# Patient Record
Sex: MALE | Race: WHITE
[De-identification: names, ages, dates, MRNs, and addresses within clinical notes are randomized per-mention and may not be internally consistent; named-entity substitution may affect disease eponyms.]

---

## 2018-08-24 ENCOUNTER — HOSPITAL ENCOUNTER (OUTPATIENT)
Dept: HOSPITAL 42 - ED | Age: 23
Setting detail: OBSERVATION
LOS: 1 days | Discharge: LEFT BEFORE DISCHARGE | End: 2018-08-25
Attending: INTERNAL MEDICINE | Admitting: INTERNAL MEDICINE
Payer: MEDICAID

## 2018-08-24 DIAGNOSIS — F12.90: ICD-10-CM

## 2018-08-24 DIAGNOSIS — Z83.3: ICD-10-CM

## 2018-08-24 DIAGNOSIS — F41.0: ICD-10-CM

## 2018-08-24 DIAGNOSIS — Z88.0: ICD-10-CM

## 2018-08-24 DIAGNOSIS — D50.9: ICD-10-CM

## 2018-08-24 DIAGNOSIS — F90.9: ICD-10-CM

## 2018-08-24 DIAGNOSIS — K76.0: Primary | ICD-10-CM

## 2018-08-24 DIAGNOSIS — D72.829: ICD-10-CM

## 2018-08-24 DIAGNOSIS — F41.1: ICD-10-CM

## 2018-08-24 DIAGNOSIS — Z82.3: ICD-10-CM

## 2018-08-24 DIAGNOSIS — R11.10: ICD-10-CM

## 2018-08-24 DIAGNOSIS — Z87.81: ICD-10-CM

## 2018-08-24 LAB
ALBUMIN SERPL-MCNC: 4.7 G/DL (ref 3–4.8)
ALBUMIN/GLOB SERPL: 1.6 {RATIO} (ref 1.1–1.8)
ALT SERPL-CCNC: 19 U/L (ref 7–56)
AMYLASE SERPL-CCNC: 364 U/L (ref 35–125)
APPEARANCE UR: CLEAR
AST SERPL-CCNC: 20 U/L (ref 17–59)
BASOPHILS # BLD AUTO: 0.01 K/MM3 (ref 0–2)
BASOPHILS NFR BLD: 0.1 % (ref 0–3)
BILIRUB UR-MCNC: NEGATIVE MG/DL
BUN SERPL-MCNC: 15 MG/DL (ref 7–21)
CALCIUM SERPL-MCNC: 9.5 MG/DL (ref 8.4–10.5)
COLOR UR: YELLOW
EOSINOPHIL # BLD: 0 10*3/UL (ref 0–0.7)
EOSINOPHIL NFR BLD: 0.1 % (ref 1.5–5)
ERYTHROCYTE [DISTWIDTH] IN BLOOD BY AUTOMATED COUNT: 16.4 % (ref 11.5–14.5)
GFR NON-AFRICAN AMERICAN: > 60
GLUCOSE UR STRIP-MCNC: NEGATIVE MG/DL
GRANULOCYTES # BLD: 17.2 10*3/UL (ref 1.4–6.5)
GRANULOCYTES NFR BLD: 90.7 % (ref 50–68)
HDLC SERPL-MCNC: 47 MG/DL (ref 29–60)
HGB BLD-MCNC: 13.2 G/DL (ref 14–18)
HYPOCHROMIA BLD QL SMEAR: (no result)
LDLC SERPL-MCNC: 74 MG/DL (ref 0–129)
LEUKOCYTE ESTERASE UR-ACNC: NEGATIVE LEU/UL
LIPASE SERPL-CCNC: 747 U/L (ref 23–300)
LYMPHOCYTE: 5 % (ref 22–35)
LYMPHOCYTES # BLD: 1 10*3/UL (ref 1.2–3.4)
LYMPHOCYTES NFR BLD AUTO: 5.3 % (ref 22–35)
MCH RBC QN AUTO: 19.6 PG (ref 25–35)
MCHC RBC AUTO-ENTMCNC: 32.8 G/DL (ref 31–37)
MCV RBC AUTO: 59.8 FL (ref 80–105)
MICROCYTES BLD QL SMEAR: (no result)
MONOCYTE: 5 % (ref 1–6)
MONOCYTES # BLD AUTO: 0.7 10*3/UL (ref 0.1–0.6)
MONOCYTES NFR BLD: 3.8 % (ref 1–6)
NEUTROPHILS NFR BLD AUTO: 90 % (ref 50–70)
PH UR STRIP: 6 [PH] (ref 4.7–8)
PLATELET # BLD EST: NORMAL 10*3/UL
PLATELET # BLD: 178 10^3/UL (ref 120–450)
PROT UR STRIP-MCNC: NEGATIVE MG/DL
RBC # BLD AUTO: 6.72 10^6/UL (ref 3.5–6.1)
RBC # UR STRIP: NEGATIVE /UL
SP GR UR STRIP: 1.01 (ref 1–1.03)
UROBILINOGEN UR STRIP-ACNC: 0.2 E.U./DL
WBC # BLD AUTO: 18.9 10^3/UL (ref 4.5–11)

## 2018-08-24 PROCEDURE — 76700 US EXAM ABDOM COMPLETE: CPT

## 2018-08-24 PROCEDURE — 82728 ASSAY OF FERRITIN: CPT

## 2018-08-24 PROCEDURE — 80074 ACUTE HEPATITIS PANEL: CPT

## 2018-08-24 PROCEDURE — 36415 COLL VENOUS BLD VENIPUNCTURE: CPT

## 2018-08-24 PROCEDURE — 93005 ELECTROCARDIOGRAM TRACING: CPT

## 2018-08-24 PROCEDURE — 96376 TX/PRO/DX INJ SAME DRUG ADON: CPT

## 2018-08-24 PROCEDURE — 99285 EMERGENCY DEPT VISIT HI MDM: CPT

## 2018-08-24 PROCEDURE — 84100 ASSAY OF PHOSPHORUS: CPT

## 2018-08-24 PROCEDURE — 82247 BILIRUBIN TOTAL: CPT

## 2018-08-24 PROCEDURE — 96374 THER/PROPH/DIAG INJ IV PUSH: CPT

## 2018-08-24 PROCEDURE — 82248 BILIRUBIN DIRECT: CPT

## 2018-08-24 PROCEDURE — 82150 ASSAY OF AMYLASE: CPT

## 2018-08-24 PROCEDURE — 74177 CT ABD & PELVIS W/CONTRAST: CPT

## 2018-08-24 PROCEDURE — 81003 URINALYSIS AUTO W/O SCOPE: CPT

## 2018-08-24 PROCEDURE — 80061 LIPID PANEL: CPT

## 2018-08-24 PROCEDURE — 83690 ASSAY OF LIPASE: CPT

## 2018-08-24 PROCEDURE — 71045 X-RAY EXAM CHEST 1 VIEW: CPT

## 2018-08-24 PROCEDURE — 84443 ASSAY THYROID STIM HORMONE: CPT

## 2018-08-24 PROCEDURE — 87040 BLOOD CULTURE FOR BACTERIA: CPT

## 2018-08-24 PROCEDURE — 83021 HEMOGLOBIN CHROMOTOGRAPHY: CPT

## 2018-08-24 PROCEDURE — 85025 COMPLETE CBC W/AUTO DIFF WBC: CPT

## 2018-08-24 PROCEDURE — 83735 ASSAY OF MAGNESIUM: CPT

## 2018-08-24 PROCEDURE — 85041 AUTOMATED RBC COUNT: CPT

## 2018-08-24 PROCEDURE — 83540 ASSAY OF IRON: CPT

## 2018-08-24 PROCEDURE — 96375 TX/PRO/DX INJ NEW DRUG ADDON: CPT

## 2018-08-24 PROCEDURE — 83550 IRON BINDING TEST: CPT

## 2018-08-24 PROCEDURE — 85014 HEMATOCRIT: CPT

## 2018-08-24 PROCEDURE — 96361 HYDRATE IV INFUSION ADD-ON: CPT

## 2018-08-24 PROCEDURE — 85018 HEMOGLOBIN: CPT

## 2018-08-24 PROCEDURE — 80053 COMPREHEN METABOLIC PANEL: CPT

## 2018-08-24 NOTE — ED PDOC
Arrival/HPI





- General


Chief Complaint: Abdominal Pain


Time Seen by Provider: 08/24/18 13:05


Historian: Patient





- History of Present Illness


Narrative History of Present Illness (Text): 





08/24/18 15:49


22-year-old male presents today with upper abdominal pain and nausea and 

vomiting that started this morning. Patient states he is having severe upper 

abdominal pain with associated nausea for the early portion in the morning and 

then developed multiple episodes of vomiting. pt denies cp or sob. no dizziness

, but states he feels weak today. pt denies back pain. no urinary symptoms. no 

other complaints. 


Symptom Onset: Sudden


Symptom Course: Unchanged





Past Medical History





- Provider Review


Nursing Documentation Reviewed: Yes





- Travel History


Have you recently traveled outside [place] w/in the past 3 mons?: No





- Cardiac


Hx Cardiac Disorders: No





- Pulmonary


Hx Respiratory Disorders: No





- Neurological


Hx Neurological Disorder: No





- HEENT


Hx HEENT Disorder: No





- Renal


Hx Renal Disorder: No





- Endocrine/Metabolic


Hx Endocrine Disorders: No





- Hematological/Oncological


Hx Blood Disorders: No





- Integumentary


Hx Dermatological Disorder: No





- Musculoskeletal/Rheumatological


Hx Musculoskeletal Disorders: No





- Gastrointestinal


Hx Gastrointestinal Disorders: No





- Genitourinary/Gynecological


Hx Genitourinary Disorders: No





- Psychiatric


Hx Anxiety: Yes


Hx Substance Use: Yes (Marijuana)





- Surgical History


Other/Comment: Left knee.  Right hand





Family/Social History





- Physician Review


Nursing Documentation Reviewed: Yes


Family/Social History: Unknown Family HX


Smoking Status: Never Smoked


Hx Alcohol Use: Yes


Frequency of alcohol use: Socially


Hx Substance Use: Yes (Marijuana)





Allergies/Home Meds


Allergies/Adverse Reactions: 


Allergies





Penicillins Allergy (Verified 08/24/18 12:44)


 FATIGUE








Home Medications: 


 Home Meds











 Medication  Instructions  Recorded  Confirmed


 


No Known Home Med  08/24/18 08/24/18














Review of Systems





- Review of Systems


Constitutional: Fatigue.  absent: Fevers


Respiratory: absent: SOB, Cough


Cardiovascular: absent: Chest Pain, Palpitations


Gastrointestinal: Abdominal Pain, Nausea, Vomiting.  absent: Constipation


Genitourinary Male: absent: Dysuria, Frequency, Hematuria


Musculoskeletal: absent: Arthralgias, Back Pain, Neck Pain


Skin: absent: Rash, Pruritis


Neurological: absent: Headache, Dizziness


Psychiatric: absent: Anxiety, Depression, Suicidal Ideation





Physical Exam


Vital Signs Reviewed: Yes


Vital Signs











  Temp Pulse Resp BP Pulse Ox


 


 08/24/18 19:12   85  18  131/71  98


 


 08/24/18 16:09   72  18  131/79  99


 


 08/24/18 12:45  97.9 F  78  18  137/85  98











Temperature: Afebrile


Blood Pressure: Normal


Pulse: Regular


Respiratory Rate: Normal


Appearance: Positive for: Well-Appearing, Non-Toxic, Comfortable


Pain Distress: None


Mental Status: Positive for: Alert and Oriented X 3





- Systems Exam


Head: Present: Atraumatic


Mouth: Present: Moist Mucous Membranes


Neck: Present: Normal Range of Motion


Respiratory/Chest: Present: Clear to Auscultation, Good Air Exchange.  No: 

Respiratory Distress, Accessory Muscle Use


Cardiovascular: Present: Regular Rate and Rhythm, Normal S1, S2.  No: Murmurs


Abdomen: Present: Tenderness (ruq, epigastric tenderness).  No: Distention, 

Peritoneal Signs, Rebound, Guarding


Back: Present: Normal Inspection.  No: Midline Tenderness, Paraspinal Tenderness


Upper Extremity: Present: Normal ROM


Lower Extremity: Present: Normal ROM


Neurological: Present: GCS=15, Speech Normal


Skin: Present: Warm, Dry, Normal Color.  No: Rashes


Psychiatric: Present: Alert, Oriented x 3





Medical Decision Making


ED Course and Treatment: 





08/24/18 17:04


Patient is nontoxic well appearing with stable vital signs presenting with 

abdominal pain, nausea/vomiting. 








CBC wbc; 18.9


CMP wnl


Lipase elevated





Urinalysis wnl





cxr; wnl





UDS: + marijuana





Ultrasound: FINDINGS:


LIVER:


Measures 13.2 cm.  There is diffuse increased echogenicity and coarse 

echotexture of the liver parenchyma. No mass. No intrahepatic bile duct 

dilatation.


GALLBLADDER:


There are no gallstones, wall thickening or pericholecystic fluid.  The 

sonographic Kim's sign is negative. 


COMMON BILE DUCT:


Measures 3.1 mm. No stones. No dilatation.


PANCREAS:


Unremarkable as visualized. No mass. No ductal dilatation.


RIGHT KIDNEY:


Measures 11.3cm. Normal echogenicity. No calculus, mass, or hydronephrosis.


LEFT KIDNEY:


Measures 10.9cm. Normal echogenicity. No calculus, mass, or hydronephrosis.


SPLEEN:


There is borderline splenomegaly. No mass.


AORTA:


No aneurysmal dilatation. 


IVC:


Unremarkable. 


OTHER FINDINGS:


There is trace perihepatic ascites. 


IMPRESSION:


 Diffuse increased echogenicity and coarse echotexture in the liver may reflect 

hepatic steatosis however parenchymal infectious/ inflammatory etiologies 

cannot be entirely excluded.  Clinical and laboratory correlation is advised. 


Borderline splenomegaly. 


Trace perihepatic ascites is of uncertain etiology.


If clinically indicated, CT scan with intravenous contrast may be performed for 

further evaluation.











CAT scan:FINDINGS:


LOWER THORAX:


No visible consolidation, pleural effusion, or pneumothorax.


LIVER:


Unremarkable. 


GALLBLADDER AND BILE DUCTS:


Unremarkable. 


PANCREAS:


Unremarkable. 


SPLEEN:


Borderline splenomegaly. 


ADRENALS:


Unremarkable. 


KIDNEYS AND URETERS:


The kidneys enhance symmetrically. No hydronephrosis or obstructing calculus 

identified. 


VASCULATURE:


No aortic aneurysm. 


BOWEL:


Stomach is nondistended.  


Lack of oral contrast limits evaluation for bowel pathology.  Bowel loops 

appear within normal limits of caliber without evidence of obstruction.


APPENDIX:


The appendix appears within normal limits of caliber. No secondary signs of 

acute appendicitis.


PERITONEUM:


No significant free fluid.  No definite free air. 


LYMPH NODES:


No bulky adenopathy identified. 


BLADDER:


Unremarkable. 


REPRODUCTIVE:


Unremarkable. 


BONES:


Sclerotic focus within the right femoral head, possibly bone island.  No acute 

osseous abnormality is detected. 


OTHER FINDINGS:


None.


IMPRESSION:


Borderline splenomegaly.








Patient reassessment:pt feeling better. still with slight tenderness across 

upper abdomen. 





Discussed all results with patient in depth





case discussed with dr. lehman; accepts observational status admission for 

abdominal pain, leukocytosis and elevated lipase with US finding of perihepatic 

ascites.








Impression: Abdominal pain, leukocytosis, elevated lipase


admit observational status





Reassessment Condition: Re-examined, Improving,but remains with symptoms





- Lab Interpretations


Lab Results: 








 08/24/18 14:15 





 08/24/18 14:15 





 Lab Results





08/24/18 18:25: Urine Opiates Screen Negative, Urine Methadone Screen Negative, 

Ur Barbiturates Screen Negative, Ur Phencyclidine Scrn Negative, Ur 

Amphetamines Screen Negative, U Benzodiazepines Scrn Negative, U Oth Cocaine 

Metabols Negative, U Cannabinoids Screen Positive H


08/24/18 18:25: Urine Color Yellow, Urine Appearance Clear, Urine pH 6.0, Ur 

Specific Gravity 1.010, Urine Protein Negative, Urine Glucose (UA) Negative, 

Urine Ketones Trace H, Urine Blood Negative, Urine Nitrate Negative, Urine 

Bilirubin Negative, Urine Urobilinogen 0.2, Ur Leukocyte Esterase Negative


08/24/18 14:15: WBC 18.9 H, RBC 6.72 H, Hgb 13.2 L, Hct 40.2 L, MCV 59.8 L, MCH 

19.6 L, MCHC 32.8, RDW 16.4 H, Plt Count 178, Gran % 90.7 H, Lymph % (Auto) 5.3 

L, Mono % (Auto) 3.8, Eos % (Auto) 0.1 L, Baso % (Auto) 0.1, Gran # 17.20 H, 

Lymph # (Auto) 1.0 L, Mono # (Auto) 0.7 H, Eos # (Auto) 0.0, Baso # (Auto) 0.01

, Neutrophils % (Manual) 90 H, Lymphocytes % (Manual) 5 L, Monocytes % (Manual) 

5, Platelet Evaluation Normal, Hypochromasia 1+, Microcytosis (manual) 1+


08/24/18 14:15: Sodium 143, Potassium 4.1, Chloride 105, Carbon Dioxide 25, 

Anion Gap 18, BUN 15, Creatinine 0.7 L, Est GFR ( Amer) > 60, Est GFR (

Non-Af Amer) > 60, Random Glucose 92, Calcium 9.5, Total Bilirubin 1.4 H, AST 20

, ALT 19, Alkaline Phosphatase 58, Total Protein 7.6, Albumin 4.7, Globulin 3.0

, Albumin/Globulin Ratio 1.6, Lipase 747 H











- RAD Interpretation


Radiology Orders: 








08/24/18 13:52


ABD & PELVIS IV CONTRAST ONLY [CT] Stat 





08/24/18 15:48


ABDOMEN COMPLETE [US] Stat 





08/24/18 18:23


CHEST PORTABLE [RAD] Stat 














- Medication Orders


Current Medication Orders: 











Discontinued Medications





Famotidine (Pepcid)  20 mg IVP STAT STA


   Stop: 08/24/18 13:55


   Last Admin: 08/24/18 14:25  Dose: 20 mg





IVP Administration


 Document     08/24/18 14:25  EAR  (Rec: 08/24/18 14:25  EAR  [id number])


     Charges for Administration


      # of IVP Administrations                   1





Sodium Chloride (Sodium Chloride 0.9%)  1,000 mls @ 999 mls/hr IV .Q1H1M STA


   Stop: 08/24/18 14:05


   Last Admin: 08/24/18 14:22  Dose: 999 mls/hr





eMAR Start Stop


 Document     08/24/18 14:22  EAR  (Rec: 08/24/18 14:22  [facility]  [id number])


     Intravenous Solution


      Start Date                                 08/24/18


      Start Time                                 14:15


      End Date                                   08/24/18


      End time                                   15:15


      Total Infusion Time                        60





Ondansetron HCl (Zofran Inj)  4 mg IVP STAT STA


   Stop: 08/24/18 13:55


   Last Admin: 08/24/18 14:25  Dose: 4 mg





IVP Administration


 Document     08/24/18 14:25  [facility]  (Rec: 08/24/18 14:25  [id number]


     Charges for Administration


      # of IVP Administrations                   1














Disposition/Present on Arrival





- Present on Arrival


Any Indicators Present on Arrival: No


History of DVT/PE: No


History of Uncontrolled Diabetes: No


Urinary Catheter: No


History of Decub. Ulcer: No


History Surgical Site Infection Following: None





- Disposition


Have Diagnosis and Disposition been Completed?: Yes


Diagnosis: 


 Abdominal pain, Leukocytosis, Elevated lipase





Disposition: HOSPITALIZED


Disposition Time: 17:00


Patient Plan: Observation


Condition: FAIR


Forms:  [employer] (English)

## 2018-08-24 NOTE — CP.PCM.HP
History of Present Illness





- History of Present Illness


History of Present Illness: 


Diana Alvarado, PGY-1, Internal Medicine History and Physical for Dr. Aponte





CC: multiple episodes of vomiting





22 year old male with past medical history of anxiety and panic attack presents 

with fatigue, chills, shortness of breath, and multiple episodes of vomiting. 

Patient reports that the night of 8/23, he felt fatigue, chills, and shortness 

of breath. Patient reports cough with green-yellow sputum. Patient did not take 

anything for these symptoms. Patient went to sleep and woke up the next morning 

without the prior symptoms. However, patient ended up having 5-6 episodes of 

yellow vomit relieved with zofran in the ED. Patient also reported epigastric, 

nonradiating, intermittent pulling abdominal pain that started when patient 

started vomiting. Pain is associated with episodes of vomiting. Patient reports 

having similar vomiting and abdominal pain when he had a panic attack in the 

past. Patient denies any barbecue, raw eggs, or raw seafood in his diet. He has 

not traveled outside the country in the recent past. 12-point ROS was negative 

except for what was mentioned above.





PMH: anxiety, panic attacks


PSH: pins in left femur, fractured right 5th metatarsal


Allergies: penicillin


FMHx: Mother: diabetes mellitus type II, Father: stroke, gout


SHx: denies smoking or drinking history. Reports smoking marijuana daily





PMD: denies


Pharmacy: reports going to multiple pharmacies


Insurance: denies








Present on Admission





- Present on Admission


Any Indicators Present on Admission: No


History of DVT/PE: No


History of Uncontrolled Diabetes: No





Review of Systems





- Constitutional


Constitutional: Chills, Fatigue.  absent: Anorexia, Fever





- EENT


Eyes: absent: Blurred Vision


Nose/Mouth/Throat: absent: Nasal Congestion





- Cardiovascular


Cardiovascular: Dyspnea.  absent: Chest Pain, Chest Pain at Rest





- Respiratory


Respiratory: Dyspnea.  absent: Cough





- Gastrointestinal


Gastrointestinal: Abdominal Pain, Nausea, Vomiting.  absent: Constipation, 

Diarrhea





- Genitourinary


Genitourinary: absent: Dysuria, Hematuria





- Musculoskeletal


Musculoskeletal: absent: Arthralgias, Back Pain





- Integumentary


Integumentary: absent: Rash, Swelling





- Neurological


Neurological: absent: Numbness, Tingling, Tremor





- Psychiatric


Psychiatric: Anxiety





Past Patient History





- Past Social History


Smoking Status: Never Smoked





- CARDIAC


Hx Cardiac Disorders: No





- PULMONARY


Hx Respiratory Disorders: No





- NEUROLOGICAL


Hx Neurological Disorder: No





- HEENT


Hx HEENT Problems: No





- RENAL


Hx Chronic Kidney Disease: No





- ENDOCRINE/METABOLIC


Hx Endocrine Disorders: No





- HEMATOLOGICAL/ONCOLOGICAL


Hx Blood Disorders: No





- INTEGUMENTARY


Hx Dermatological Problems: No





- MUSCULOSKELETAL/RHEUMATOLOGICAL


Hx Musculoskeletal Disorders: No





- GASTROINTESTINAL


Hx Gastrointestinal Disorders: No





- GENITOURINARY/GYNECOLOGICAL


Hx Genitourinary Disorders: No





- PSYCHIATRIC


Hx Anxiety: Yes


Hx Substance Use: Yes (Marijuana)





- SURGICAL HISTORY


Other/Comment: Left knee.  Right hand





Meds


Allergies/Adverse Reactions: 


 Allergies











Allergy/AdvReac Type Severity Reaction Status Date / Time


 


Penicillins Allergy  FATIGUE Verified 08/24/18 12:44














Physical Exam





- Constitutional


Appears: Well, Non-toxic, No Acute Distress





- Head Exam


Head Exam: ATRAUMATIC, NORMAL INSPECTION, NORMOCEPHALIC





- Eye Exam


Eye Exam: EOMI, PERRL





- ENT Exam


ENT Exam: Mucous Membranes Moist





- Respiratory Exam


Respiratory Exam: Clear to Auscultation Bilateral, NORMAL BREATHING PATTERN





- Cardiovascular Exam


Cardiovascular Exam: REGULAR RHYTHM, RRR





- GI/Abdominal Exam


GI & Abdominal Exam: Normal Bowel Sounds, Soft, Tenderness (epigastric)





- Extremities Exam


Extremities exam: Positive for: full ROM, normal inspection





- Neurological Exam


Neurological exam: Alert, CN II-XII Intact, Oriented x3





- Psychiatric Exam


Psychiatric exam: Anxious





Results





- Vital Signs


Recent Vital Signs: 





 Last Vital Signs











Temp  98.1 F   08/24/18 22:00


 


Pulse  68   08/24/18 22:00


 


Resp  20   08/24/18 22:00


 


BP  125/82   08/24/18 22:00


 


Pulse Ox  97   08/24/18 22:00














- Labs


Result Diagrams: 


 08/24/18 14:15





 08/24/18 14:15





Assessment & Plan





- Assessment and Plan (Free Text)


Assessment: 


22 year old male with past medical history of anxiety and panic attack presents 

with fatigue, chills, shortness of breath, and multiple episodes of vomiting. 

Patient will be admitted for abdominal pain and leukocytosis 2/2 to hyperemesis 

cannabinoid syndrome vs. pancreatitis.





Plan: 


Abdominal pain and vomiting 2/2 to hyperemesis gravidum vs. pancreatitis vs. 

panic attack vs. gastritis


-CT abdomen: borderline splenomegaly


-Abdominal U/S:  Diffuse increased echogenicity and coarse echotexture in the 

liver may reflect hepatic steatosis however parenchymal infectious/ 

inflammatory etiologies cannot be entirely excluded.  Clinical and laboratory 

correlation is advised. Borderline splenomegaly. Trace perihepatic ascites is 

of uncertain etiology. 


-UDS: cannabinoids positive


-UA: trace ketones, LE negative, no blood


-Lipase: 747


-Amylase: 364


-Normal LFTs.


-Bilirubin: 1.4


-Hepatitis panel ordered.


-Zofran 4 mg Q4PRN for nausea


-Protonix 40 mg daily


-NPO except meds. Will progress diet as tolerated


-NS


-GI, Dr. Berman, consulted for recommendations.


-Patient has history of panic attacks but does not take any medication for 

anxiety.


-Psych, Dr. Best, consulted for recommendations.





Leukocytosis 2/2 to infection vs. stress from symptoms


-WBC: 18.9


-Chest X ray: no consolidations, no effusions, normal heart silhoutte as read 

by me


-UA: trace ketones, LE negative, no blood


-CT abdomen: borderline splenomegaly


-Follow up WBC count in the AM.





Microcytic Anemia


-Hgb: 13.2


-Iron, ferritin, TIBC, Hgb electrophoresis.


-GI, Dr. Berman, consulted for recommendations.





Frontal Headache likely 2/2 tension headache


-Motrin 400 mg Q6PRN for headache





Substance Abuse


-Patient reports daily marijuana use


-Patient counseled regarding substance abuse and patient reported he understood.


-Possible excessive marijuana use due to anxiety.


-Psych, Dr. Best, consulted for recommendations.





DVT prophylaxis: SCD


GI prophylaxis: protonix 40 daily





Patient seen and assessed with Dr. Aponte.








- Date & Time


Date: 08/24/18


Time: 23:26

## 2018-08-24 NOTE — CT
Date of service: 



08/24/2018



PROCEDURE:  CT Abdomen and Pelvis with contrast



HISTORY:

abd pain



COMPARISON:

Abdominal ultrasound performed 8/24/18



TECHNIQUE:

Contrast dose: 99 cc Omnipaque 350



Radiation dose:



Total exam DLP = 390.81 mGy-cm.



This CT exam was performed using one or more of the following dose 

reduction techniques: Automated exposure control, adjustment of the 

mA and/or kV according to patient size, and/or use of iterative 

reconstruction technique.



FINDINGS:



LOWER THORAX:

No visible consolidation, pleural effusion, or pneumothorax.



LIVER:

Unremarkable. 



GALLBLADDER AND BILE DUCTS:

Unremarkable. 



PANCREAS:

Unremarkable. 



SPLEEN:

Borderline splenomegaly. 



ADRENALS:

Unremarkable. 



KIDNEYS AND URETERS:

The kidneys enhance symmetrically. No hydronephrosis or obstructing 

calculus identified. 



VASCULATURE:

No aortic aneurysm. 



BOWEL:

Stomach is nondistended.  



Lack of oral contrast limits evaluation for bowel pathology.  Bowel 

loops appear within normal limits of caliber without evidence of 

obstruction.



APPENDIX:

The appendix appears within normal limits of caliber. No secondary 

signs of acute appendicitis.



PERITONEUM:

No significant free fluid.  No definite free air. 



LYMPH NODES:

No bulky adenopathy identified. 



BLADDER:

Unremarkable. 



REPRODUCTIVE:

Unremarkable. 



BONES:

Sclerotic focus within the right femoral head, possibly bone island.  

No acute osseous abnormality is detected. 



OTHER FINDINGS:

None.



IMPRESSION:

Borderline splenomegaly.

## 2018-08-24 NOTE — US
Date of service: 



08/24/2018



HISTORY:

Abdominal pain 



COMPARISON:

None.



TECHNIQUE:

Grayscale imaging was performed.



FINDINGS:



LIVER:

Measures 13.2 cm.  There is diffuse increased echogenicity and coarse 

echotexture of the liver parenchyma. No mass. No intrahepatic bile 

duct dilatation.



GALLBLADDER:

There are no gallstones, wall thickening or pericholecystic fluid.  

The sonographic Kim's sign is negative. 



COMMON BILE DUCT:

Measures 3.1 mm. No stones. No dilatation.



PANCREAS:

Unremarkable as visualized. No mass. No ductal dilatation.



RIGHT KIDNEY:

Measures 11.3cm. Normal echogenicity. No calculus, mass, or 

hydronephrosis.



LEFT KIDNEY:

Measures 10.9cm. Normal echogenicity. No calculus, mass, or 

hydronephrosis.



SPLEEN:

There is borderline splenomegaly. No mass.



AORTA:

No aneurysmal dilatation. 



IVC:

Unremarkable. 



OTHER FINDINGS:

There is trace perihepatic ascites. 



IMPRESSION:

 Diffuse increased echogenicity and coarse echotexture in the liver 

may reflect hepatic steatosis however parenchymal infectious/ 

inflammatory etiologies cannot be entirely excluded.  Clinical and 

laboratory correlation is advised. 



Borderline splenomegaly. 



Trace perihepatic ascites is of uncertain etiology.



If clinically indicated, CT scan with intravenous contrast may be 

performed for further evaluation.

## 2018-08-25 VITALS
HEART RATE: 87 BPM | DIASTOLIC BLOOD PRESSURE: 69 MMHG | RESPIRATION RATE: 20 BRPM | SYSTOLIC BLOOD PRESSURE: 130 MMHG | TEMPERATURE: 98 F | OXYGEN SATURATION: 98 %

## 2018-08-25 LAB
% IRON SATURATION: 18 % (ref 20–55)
ALBUMIN SERPL-MCNC: 3.7 G/DL (ref 3–4.8)
ALBUMIN/GLOB SERPL: 1.5 {RATIO} (ref 1.1–1.8)
ALT SERPL-CCNC: 24 U/L (ref 7–56)
AST SERPL-CCNC: 27 U/L (ref 17–59)
BASOPHILS # BLD AUTO: 0.03 K/MM3 (ref 0–2)
BASOPHILS NFR BLD: 0.3 % (ref 0–3)
BILIRUB DIRECT SERPL-MCNC: 0.1 MG/DL (ref 0–0.4)
BUN SERPL-MCNC: 13 MG/DL (ref 7–21)
CALCIUM SERPL-MCNC: 8.9 MG/DL (ref 8.4–10.5)
EOSINOPHIL # BLD: 0 10*3/UL (ref 0–0.7)
EOSINOPHIL NFR BLD: 0.4 % (ref 1.5–5)
ERYTHROCYTE [DISTWIDTH] IN BLOOD BY AUTOMATED COUNT: 16.1 % (ref 11.5–14.5)
GFR NON-AFRICAN AMERICAN: > 60
GRANULOCYTES # BLD: 6.82 10*3/UL (ref 1.4–6.5)
GRANULOCYTES NFR BLD: 73.3 % (ref 50–68)
HEPATITIS A IGM: NEGATIVE
HEPATITIS B CORE AB: NEGATIVE
HEPATITIS C ANTIBODY: NEGATIVE
HGB BLD-MCNC: 11.2 G/DL (ref 14–18)
IRON SERPL-MCNC: 49 UG/DL (ref 45–180)
LYMPHOCYTES # BLD: 1.5 10*3/UL (ref 1.2–3.4)
LYMPHOCYTES NFR BLD AUTO: 16.2 % (ref 22–35)
MCH RBC QN AUTO: 19.8 PG (ref 25–35)
MCHC RBC AUTO-ENTMCNC: 33.1 G/DL (ref 31–37)
MCV RBC AUTO: 59.8 FL (ref 80–105)
MONOCYTES # BLD AUTO: 0.9 10*3/UL (ref 0.1–0.6)
MONOCYTES NFR BLD: 9.8 % (ref 1–6)
PLATELET # BLD: 161 10^3/UL (ref 120–450)
RBC # BLD AUTO: 5.65 10^6/UL (ref 3.5–6.1)
TIBC SERPL-MCNC: 268 UG/DL (ref 261–462)
WBC # BLD AUTO: 9.3 10^3/UL (ref 4.5–11)

## 2018-08-25 NOTE — CP.PCM.DIS
<Al Barens - Last Filed: 08/25/18 14:31>





Provider





- Provider


Date of Admission: 


08/24/18 20:33





Attending physician: 


Gilbert Black MD





Consults: 





GI


Psych





Time Spent in preparation of Discharge (in minutes): 70





Hospital Course





- Lab Results


Lab Results: 


 Most Recent Lab Values











WBC  9.3 10^3/ul (4.5-11.0)  D 08/25/18  06:30    


 


RBC  5.65 10^6/uL (3.5-6.1)   08/25/18  06:30    


 


Hgb  11.2 g/dL (14.0-18.0)  L D 08/25/18  06:30    


 


Hct  33.8 % (42.0-52.0)  L  08/25/18  06:30    


 


MCV  59.8 fl (80.0-105.0)  L  08/25/18  06:30    


 


MCH  19.8 pg (25.0-35.0)  L  08/25/18  06:30    


 


MCHC  33.1 g/dl (31.0-37.0)   08/25/18  06:30    


 


RDW  16.1 % (11.5-14.5)  H  08/25/18  06:30    


 


Plt Count  161 10^3/uL (120.0-450.0)   08/25/18  06:30    


 


Gran %  73.3 % (50.0-68.0)  H  08/25/18  06:30    


 


Lymph % (Auto)  16.2 % (22.0-35.0)  L  08/25/18  06:30    


 


Mono % (Auto)  9.8 % (1.0-6.0)  H  08/25/18  06:30    


 


Eos % (Auto)  0.4 % (1.5-5.0)  L  08/25/18  06:30    


 


Baso % (Auto)  0.3 % (0.0-3.0)   08/25/18  06:30    


 


Gran #  6.82  (1.4-6.5)  H  08/25/18  06:30    


 


Lymph # (Auto)  1.5  (1.2-3.4)   08/25/18  06:30    


 


Mono # (Auto)  0.9  (0.1-0.6)  H  08/25/18  06:30    


 


Eos # (Auto)  0.0  (0.0-0.7)   08/25/18  06:30    


 


Baso # (Auto)  0.03 K/mm3 (0.0-2.0)   08/25/18  06:30    


 


Neutrophils % (Manual)  90 % (50.0-70.0)  H  08/24/18  14:15    


 


Lymphocytes % (Manual)  5 % (22.0-35.0)  L  08/24/18  14:15    


 


Monocytes % (Manual)  5 % (1.0-6.0)   08/24/18  14:15    


 


Platelet Evaluation  Normal  (NORMAL)   08/24/18  14:15    


 


Hypochromasia  1+   08/24/18  14:15    


 


Microcytosis (manual)  1+   08/24/18  14:15    


 


Sodium  141 mmol/L (132-148)   08/25/18  06:30    


 


Potassium  3.7 mmol/L (3.6-5.0)   08/25/18  06:30    


 


Chloride  105 mmol/L ()   08/25/18  06:30    


 


Carbon Dioxide  27 mmol/L (21-33)   08/25/18  06:30    


 


Anion Gap  13  (10-20)   08/25/18  06:30    


 


BUN  13 mg/dL (7-21)   08/25/18  06:30    


 


Creatinine  0.8 mg/dl (0.8-1.5)   08/25/18  06:30    


 


Est GFR ( Amer)  > 60   08/25/18  06:30    


 


Est GFR (Non-Af Amer)  > 60   08/25/18  06:30    


 


Random Glucose  87 mg/dL ()   08/25/18  06:30    


 


Calcium  8.9 mg/dL (8.4-10.5)   08/25/18  06:30    


 


Phosphorus  3.1 mg/dL (2.5-4.5)   08/25/18  06:30    


 


Magnesium  1.8 mg/dL (1.7-2.2)   08/25/18  06:30    


 


Iron  49 ug/dL ()   08/25/18  06:00    


 


TIBC  268 ug/dL (261-462)   08/25/18  06:00    


 


% Saturation  18 % (20-55)  L  08/25/18  06:00    


 


Ferritin  123.0 ng/mL  08/25/18  06:00    


 


Total Bilirubin  1.2 mg/dL (0.2-1.3)   08/25/18  06:30    


 


Direct Bilirubin  0.1 mg/dL (0.0-0.4)   08/25/18  06:00    


 


AST  27 U/L (17-59)   08/25/18  06:30    


 


ALT  24 U/L (7-56)   08/25/18  06:30    


 


Alkaline Phosphatase  44 U/L ()   08/25/18  06:30    


 


Total Protein  6.2 g/dL (5.8-8.3)   08/25/18  06:30    


 


Albumin  3.7 g/dL (3.0-4.8)   08/25/18  06:30    


 


Globulin  2.5 gm/dL  08/25/18  06:30    


 


Albumin/Globulin Ratio  1.5  (1.1-1.8)   08/25/18  06:30    


 


Triglycerides  83 mg/dL ()   08/24/18  14:15    


 


Cholesterol  141 mg/dL (130-200)   08/24/18  14:15    


 


LDL Cholesterol Direct  74 mg/dL (0-129)   08/24/18  14:15    


 


HDL Cholesterol  47 mg/dL (29-60)   08/24/18  14:15    


 


Amylase  364 U/L ()  H  08/24/18  14:15    


 


Lipase  747 U/L ()  H  08/24/18  14:15    


 


TSH 3rd Generation  0.72 mIU/mL (0.46-4.68)   08/24/18  14:15    


 


Urine Color  Yellow  (YELLOW)   08/24/18  18:25    


 


Urine Appearance  Clear  (CLEAR)   08/24/18  18:25    


 


Urine pH  6.0  (4.7-8.0)   08/24/18  18:25    


 


Ur Specific Gravity  1.010  (1.005-1.035)   08/24/18  18:25    


 


Urine Protein  Negative mg/dL (<30 mg/dL)   08/24/18  18:25    


 


Urine Glucose (UA)  Negative mg/dL (NEGATIVE)   08/24/18  18:25    


 


Urine Ketones  Trace mg/dL (NEGATIVE)  H  08/24/18  18:25    


 


Urine Blood  Negative  (NEGATIVE)   08/24/18  18:25    


 


Urine Nitrate  Negative  (NEGATIVE)   08/24/18  18:25    


 


Urine Bilirubin  Negative  (NEGATIVE)   08/24/18  18:25    


 


Urine Urobilinogen  0.2 E.U./dL (<1 E.U./dL)   08/24/18  18:25    


 


Ur Leukocyte Esterase  Negative Adam/uL (NEGATIVE)   08/24/18  18:25    


 


Urine Opiates Screen  Negative  (NEGATIVE)   08/24/18  18:25    


 


Urine Methadone Screen  Negative  (NEGATIVE)   08/24/18  18:25    


 


Ur Barbiturates Screen  Negative  (NEGATIVE)   08/24/18  18:25    


 


Ur Phencyclidine Scrn  Negative  (NEGATIVE)   08/24/18  18:25    


 


Ur Amphetamines Screen  Negative  (NEGATIVE)   08/24/18  18:25    


 


U Benzodiazepines Scrn  Negative  (NEGATIVE)   08/24/18  18:25    


 


U Oth Cocaine Metabols  Negative  (NEGATIVE)   08/24/18  18:25    


 


U Cannabinoids Screen  Positive  (NEGATIVE)  H  08/24/18  18:25    


 


Hepatitis A IgM Ab  Negative  (NEGATIVE)   08/25/18  06:00    


 


Hep Bs Antigen  Negative  (NEGATIVE)   08/25/18  06:00    


 


Hep B Core IgM Ab  Negative  (NEGATIVE)   08/25/18  06:00    


 


Hepatitis C Antibody  Negative  (NEGATIVE)   08/25/18  06:00    














- Hospital Course


Hospital Course: 





Mr. Muir is a 22 year old male with past medical history of anxiety and 

panic attack presents with fatigue, chills, shortness of breath, and multiple 

episodes of vomiting. Patient reports that the night of 8/23, he felt fatigue, 

chills, and shortness of breath. Patient reports cough with green-yellow 

sputum. Patient did not take anything for these symptoms. Patient went to sleep 

and woke up the next morning without the prior symptoms. However, patient ended 

up having 5-6 episodes of yellow vomit relieved with zofran in the ED. Patient 

also reported epigastric, nonradiating, intermittent pulling abdominal pain 

that started when patient started vomiting. Pain is associated with episodes of 

vomiting. Patient reports having similar vomiting and abdominal pain when he 

had a panic attack in the past. Patient denies any barbecue, raw eggs, or raw 

seafood in his diet. He has not traveled outside the country in the recent 

past. 12-point ROS was negative except for what was mentioned above.





In the ED, pt received a CT abdomen/pelvis that showed borderline splenomegaly. 

Abd US showed diffuse increased echogenicity and coarse echotexture in the 

liver may reflect hepatic steatosis.


WBC was 18.9.


UDS was positive for cannabinoids.


Pt was given zofran and kept NPO. 





Pt was admitted for r/o pancreatitis vs. cannabinoid hyperemesis. 





GI and Psych were consulted but did not see the patient before he signed out 

against medical advice. 





Upon admission, pt reported the nausea and vomiting subsided. He had no 

complaints. He requested to sign out against medical advice. Pt was advised to 

stay and complete the course of his hospital treatment, and the risks of 

leaving before completion were explained. Pt acknowledged the risks and benefits

, and decided to leave against medical advice. 








Discharge Exam





- Head Exam


Head Exam: ATRAUMATIC, NORMAL INSPECTION, NORMOCEPHALIC





- Eye Exam


Eye Exam: Normal appearance, PERRL


Pupil Exam: NORMAL ACCOMODATION





- Respiratory Exam


Respiratory Exam: Clear to PA & Lateral, NORMAL BREATHING PATTERN





- Cardiovascular Exam


Cardiovascular Exam: REGULAR RHYTHM, +S1, +S2





- GI/Abdominal Exam


GI & Abdominal Exam: Normal Bowel Sounds, Soft.  absent: Distended, Tenderness





- Back Exam


Back exam: NORMAL INSPECTION.  absent: CVA tenderness (L), CVA tenderness (R)





- Neurological Exam


Neurological exam: Alert, Oriented x3





- Skin


Skin Exam: Normal Color





Discharge Plan





- Follow Up Plan


Condition: FAIR


Disposition: AGAINST MEDICAL ADVICE





<Rachael Monroe R - Last Filed: 08/25/18 17:39>





Provider





- Provider


Date of Admission: 


08/24/18 20:33





Attending physician: 


Gilbert Black MD








Hospital Course





- Lab Results


Lab Results: 


 Most Recent Lab Values











WBC  9.3 10^3/ul (4.5-11.0)  D 08/25/18  06:30    


 


RBC  5.65 10^6/uL (3.5-6.1)   08/25/18  06:30    


 


Hgb  11.2 g/dL (14.0-18.0)  L D 08/25/18  06:30    


 


Hct  33.8 % (42.0-52.0)  L  08/25/18  06:30    


 


MCV  59.8 fl (80.0-105.0)  L  08/25/18  06:30    


 


MCH  19.8 pg (25.0-35.0)  L  08/25/18  06:30    


 


MCHC  33.1 g/dl (31.0-37.0)   08/25/18  06:30    


 


RDW  16.1 % (11.5-14.5)  H  08/25/18  06:30    


 


Plt Count  161 10^3/uL (120.0-450.0)   08/25/18  06:30    


 


Gran %  73.3 % (50.0-68.0)  H  08/25/18  06:30    


 


Lymph % (Auto)  16.2 % (22.0-35.0)  L  08/25/18  06:30    


 


Mono % (Auto)  9.8 % (1.0-6.0)  H  08/25/18  06:30    


 


Eos % (Auto)  0.4 % (1.5-5.0)  L  08/25/18  06:30    


 


Baso % (Auto)  0.3 % (0.0-3.0)   08/25/18  06:30    


 


Gran #  6.82  (1.4-6.5)  H  08/25/18  06:30    


 


Lymph # (Auto)  1.5  (1.2-3.4)   08/25/18  06:30    


 


Mono # (Auto)  0.9  (0.1-0.6)  H  08/25/18  06:30    


 


Eos # (Auto)  0.0  (0.0-0.7)   08/25/18  06:30    


 


Baso # (Auto)  0.03 K/mm3 (0.0-2.0)   08/25/18  06:30    


 


Neutrophils % (Manual)  90 % (50.0-70.0)  H  08/24/18  14:15    


 


Lymphocytes % (Manual)  5 % (22.0-35.0)  L  08/24/18  14:15    


 


Monocytes % (Manual)  5 % (1.0-6.0)   08/24/18  14:15    


 


Platelet Evaluation  Normal  (NORMAL)   08/24/18  14:15    


 


Hypochromasia  1+   08/24/18  14:15    


 


Microcytosis (manual)  1+   08/24/18  14:15    


 


Sodium  141 mmol/L (132-148)   08/25/18  06:30    


 


Potassium  3.7 mmol/L (3.6-5.0)   08/25/18  06:30    


 


Chloride  105 mmol/L ()   08/25/18  06:30    


 


Carbon Dioxide  27 mmol/L (21-33)   08/25/18  06:30    


 


Anion Gap  13  (10-20)   08/25/18  06:30    


 


BUN  13 mg/dL (7-21)   08/25/18  06:30    


 


Creatinine  0.8 mg/dl (0.8-1.5)   08/25/18  06:30    


 


Est GFR ( Amer)  > 60   08/25/18  06:30    


 


Est GFR (Non-Af Amer)  > 60   08/25/18  06:30    


 


Random Glucose  87 mg/dL ()   08/25/18  06:30    


 


Calcium  8.9 mg/dL (8.4-10.5)   08/25/18  06:30    


 


Phosphorus  3.1 mg/dL (2.5-4.5)   08/25/18  06:30    


 


Magnesium  1.8 mg/dL (1.7-2.2)   08/25/18  06:30    


 


Iron  49 ug/dL ()   08/25/18  06:00    


 


TIBC  268 ug/dL (261-462)   08/25/18  06:00    


 


% Saturation  18 % (20-55)  L  08/25/18  06:00    


 


Ferritin  123.0 ng/mL  08/25/18  06:00    


 


Total Bilirubin  1.2 mg/dL (0.2-1.3)   08/25/18  06:30    


 


Direct Bilirubin  0.1 mg/dL (0.0-0.4)   08/25/18  06:00    


 


AST  27 U/L (17-59)   08/25/18  06:30    


 


ALT  24 U/L (7-56)   08/25/18  06:30    


 


Alkaline Phosphatase  44 U/L ()   08/25/18  06:30    


 


Total Protein  6.2 g/dL (5.8-8.3)   08/25/18  06:30    


 


Albumin  3.7 g/dL (3.0-4.8)   08/25/18  06:30    


 


Globulin  2.5 gm/dL  08/25/18  06:30    


 


Albumin/Globulin Ratio  1.5  (1.1-1.8)   08/25/18  06:30    


 


Triglycerides  83 mg/dL ()   08/24/18  14:15    


 


Cholesterol  141 mg/dL (130-200)   08/24/18  14:15    


 


LDL Cholesterol Direct  74 mg/dL (0-129)   08/24/18  14:15    


 


HDL Cholesterol  47 mg/dL (29-60)   08/24/18  14:15    


 


Amylase  364 U/L ()  H  08/24/18  14:15    


 


Lipase  747 U/L ()  H  08/24/18  14:15    


 


TSH 3rd Generation  0.72 mIU/mL (0.46-4.68)   08/24/18  14:15    


 


Urine Color  Yellow  (YELLOW)   08/24/18  18:25    


 


Urine Appearance  Clear  (CLEAR)   08/24/18  18:25    


 


Urine pH  6.0  (4.7-8.0)   08/24/18  18:25    


 


Ur Specific Gravity  1.010  (1.005-1.035)   08/24/18  18:25    


 


Urine Protein  Negative mg/dL (<30 mg/dL)   08/24/18  18:25    


 


Urine Glucose (UA)  Negative mg/dL (NEGATIVE)   08/24/18  18:25    


 


Urine Ketones  Trace mg/dL (NEGATIVE)  H  08/24/18  18:25    


 


Urine Blood  Negative  (NEGATIVE)   08/24/18  18:25    


 


Urine Nitrate  Negative  (NEGATIVE)   08/24/18  18:25    


 


Urine Bilirubin  Negative  (NEGATIVE)   08/24/18  18:25    


 


Urine Urobilinogen  0.2 E.U./dL (<1 E.U./dL)   08/24/18  18:25    


 


Ur Leukocyte Esterase  Negative Adam/uL (NEGATIVE)   08/24/18  18:25    


 


Urine Opiates Screen  Negative  (NEGATIVE)   08/24/18  18:25    


 


Urine Methadone Screen  Negative  (NEGATIVE)   08/24/18  18:25    


 


Ur Barbiturates Screen  Negative  (NEGATIVE)   08/24/18  18:25    


 


Ur Phencyclidine Scrn  Negative  (NEGATIVE)   08/24/18  18:25    


 


Ur Amphetamines Screen  Negative  (NEGATIVE)   08/24/18  18:25    


 


U Benzodiazepines Scrn  Negative  (NEGATIVE)   08/24/18  18:25    


 


U Oth Cocaine Metabols  Negative  (NEGATIVE)   08/24/18  18:25    


 


U Cannabinoids Screen  Positive  (NEGATIVE)  H  08/24/18  18:25    


 


Hepatitis A IgM Ab  Negative  (NEGATIVE)   08/25/18  06:00    


 


Hep Bs Antigen  Negative  (NEGATIVE)   08/25/18  06:00    


 


Hep B Core IgM Ab  Negative  (NEGATIVE)   08/25/18  06:00    


 


Hepatitis C Antibody  Negative  (NEGATIVE)   08/25/18  06:00    














Attending/Attestation





- Attestation


I have personally seen and examined this patient.: Yes


I have fully participated in the care of the patient.: Yes


I have reviewed all pertinent clinical information, including history, physical 

exam and plan: Yes


Notes (Text): 


Patient seen and examined by me with resident at 10AM.  Case discussed with 

resident. Agree with above with following additions/corrections.


Patient is a 22 year old male with past medical history significant for anxiety 

and panic attacks that presented to emergency room with multiple episodes of 

vomiting. Please see H&P for full details. 


Patient was admitted with abdominal pain and vomiting. CT abd/pelvis per 

radiologist showed borderline splenomegaly. Abdominal ultrasound per 

radiologist showed diffuse increased echogenicity and coarse echotexture in the 

liver, may reflect hepatic steatosis; however parenchymal infectious/

inflammatory etiologies cannot be entirely excluded; borderline splenomegaly; 

trace perihepatic ascited of uncertain etiology. Lipase and amylase elevated. 

UDS positive for cannabinoids. LFTS within normal limits. Patient was made NPO. 

Placed on IV fluids. GI was consulted. Psych was consulted for history of panic 

attacks and anxiety. Patient was also found to have leukocytosis on admission 

which resolved the following day. Patient was counseled on marijuana cessation. 

Today, patient was feeling much better. Wants to eat. Diet advanced. No 

abdominal pain. No nausea or vomiting.  No chest pain or shortness of breath. 

No fevers or chills. No headaches or dizziness. No dysuria. No diarrhea or 

constipation. GI consult was pending when patient decided he wanted to sign out 

AMA





Patient requested to sign out against medical advice. This action is against my 

medical advice to the patient and the decision was made with informed refusal. 

The patient was told that further workup is necessary and a full explanation of 

the rationale was given. The risks of leaving were explained to the patient and 

include but are not limited to increased morbidity and mortality, death, and 

worsening of known or unknown conditions. Patient was AAO 3 was able to make 

this informed decision and understands the clinical situation and my 

explanation of the risks of leaving. Patient voluntarily accepts these risks 

and signed an AMA form. The patient was given a chance to ask questions and 

reconsider. He was encouraged to return to emergency room at any time for 

further care.


Physical exam:


Gen: Awake and alert sitting up in bed in no acute distress


HEENT: Normocephalic, atraumatic. Extraocular muscles intact, pupils equal 

reactive. No scleral icterus. Oropharynx is pink and moist. No pharyngeal 

erythema or exudate appreciated. Neck is supple. 


Cardiovascular: Normal rhythm.  Normal S1, S2. No murmurs, rubs, or gallops 

appreciated 


Pulmonary: Normal respiratory effort. No rhonchi, rales or wheezing 

appreciated. 


Gastrointestinal: Soft, nontender, nondistended, positive bowel sounds all 4 

quadrants, no guarding. 


Musculoskeletal: Normal range of motion all extremities, no calf tenderness. No 

CVA tenderness. 


Central nervous system:  AAO x 3. CN 2-12 grossly intact. 


Dermatologic:  Skin warm and dry





Please see chart for full details.

## 2018-08-25 NOTE — CARD
--------------- APPROVED REPORT --------------





Date of service: 08/24/2018



EKG Measurement

Heart [id number]

MA [id number]

[id number]

[id number]

[id number]



<Conclusion>

Normal sinus rhythm

Normal ECG

## 2018-08-25 NOTE — RAD
Date of service: 



08/24/2018



HISTORY:

Abdominal pain



COMPARISON:

No prior. 



FINDINGS:



LUNGS:

The lungs are well inflated and clear.



PLEURA:

No significant pleural effusion identified, no pneumothorax apparent.



CARDIOVASCULAR:

Normal.



OSSEOUS STRUCTURES:

No significant abnormalities.



VISUALIZED UPPER ABDOMEN:

Normal.



OTHER FINDINGS:

None.



IMPRESSION:

No active pulmonary disease.

## 2018-08-25 NOTE — CP.PCM.CON
History of Present Illness





- History of Present Illness


History of Present Illness: 





PGY-4 GI Fellow Initial Consult Note





Mr. Muir is a 23 yo M with h/o Anxiety, Panic attacks and MJ abuse 

presenting with nausea and vomiting.  He states during day on 8/24 he had 

several episodes of nausea and vomiting consisting of PO intake described as 

yellow.  After the onset of N/V, he states that he noticed a "pulling" mid 

epigastric pain that was worse with emesis episodes.  This night prior he 

states he felt some chills and cough productive of yellow sputum.  Furthermore, 

he states that he chronically smokes marijuana and that he has had at least one 

episode of this in the past that was attributed to a panic attack.  States that 

he normally moves his bowels almost daily with formed brown stool.  He denies 

any hematemesis, melena, hematochezia, dysphagia or weight loss.  States that 

he has never had any EGD or Colonoscopy before.





12 point ROS negative other than stated above





MHx: anxiety, panic attacks, MJ use


SurgHx: Pins in left femur, fractured right 5th metatarsal


Meds: None


FamHx: Mother: diabetes mellitus type II, Father: stroke, gout


SocHx: Denies smoking or drinking history. Reports smoking marijuana daily


All: PCN





Past Patient History





- Past Social History


Smoking Status: Never Smoked





- CARDIAC


Hx Cardiac Disorders: No





- PULMONARY


Hx Respiratory Disorders: No





- NEUROLOGICAL


Hx Neurological Disorder: No





- HEENT


Hx HEENT Problems: No





- RENAL


Hx Chronic Kidney Disease: No





- ENDOCRINE/METABOLIC


Hx Endocrine Disorders: No





- HEMATOLOGICAL/ONCOLOGICAL


Hx Blood Disorders: No





- INTEGUMENTARY


Hx Dermatological Problems: No





- MUSCULOSKELETAL/RHEUMATOLOGICAL


Hx Falls: No





- GASTROINTESTINAL


Hx Gastrointestinal Disorders: No





- GENITOURINARY/GYNECOLOGICAL


Hx Genitourinary Disorders: No





- PSYCHIATRIC


Hx Anxiety: Yes


Other/Comment: cannibus daily





- SURGICAL HISTORY


Hx Surgeries: Yes


Other/Comment: Left femur Sx with pins.  factured right foot fx





Meds


Allergies/Adverse Reactions: 


 Allergies











Allergy/AdvReac Type Severity Reaction Status Date / Time


 


Penicillins Allergy  FATIGUE Verified 08/24/18 12:44














- Medications


Medications: 


 Current Medications





Sodium Chloride (Sodium Chloride 0.9%)  1,000 mls @ 100 mls/hr IV .Q10H [facility]


   Last Admin: 08/24/18 23:00 Dose:  100 mls/hr


Ibuprofen (Motrin Tab)  400 mg PO Q6H PRN


   PRN Reason: Headache


   Last Admin: 08/24/18 23:51 Dose:  400 mg


Ondansetron HCl (Zofran Inj)  4 mg IVP Q4 [facility]


   Last Admin: 08/25/18 07:19 Dose:  Not Given


Pantoprazole Sodium (Protonix Inj)  40 mg IVP DAILY [facility]











Physical Exam





- Constitutional


Appears: Well, Non-toxic, No Acute Distress





- Head Exam


Head Exam: ATRAUMATIC, NORMAL INSPECTION





- Eye Exam


Eye Exam: EOMI.  absent: Conjunctival injection, Scleral icterus





- ENT Exam


ENT Exam: Mucous Membranes Moist.  absent: Mucous Membranes Dry, Normal 

External Ear Exam





- Respiratory Exam


Respiratory Exam: Clear to Auscultation Bilateral, NORMAL BREATHING PATTERN.  

absent: Wheezes





- Cardiovascular Exam


Cardiovascular Exam: REGULAR RHYTHM, RRR





- GI/Abdominal Exam


GI & Abdominal Exam: Normal Bowel Sounds, Soft.  absent: Bruit, Diminished 

Bowel Sounds, Distended, Firm, Guarding, Hernia, Hyperactive Bowel Sounds, 

Hypoactive Bowel Sounds, Organomegaly, Pulsatile Mass, Rigid, Tenderness





- Rectal Exam


Rectal Exam: Deferred





- Extremities Exam


Extremities exam: Positive for: normal inspection.  Negative for: pedal edema





- Neurological Exam


Neurological exam: Alert, CN II-XII Intact, Oriented x3





- Psychiatric Exam


Psychiatric exam: Normal Affect, Normal Mood





- Skin


Skin Exam: Normal Color, Warm





Results





- Vital Signs


Recent Vital Signs: 


 Last Vital Signs











Temp  98 F   08/25/18 06:00


 


Pulse  87   08/25/18 06:00


 


Resp  20   08/25/18 06:00


 


BP  130/69   08/25/18 06:00


 


Pulse Ox  98   08/25/18 06:00














- Labs


Result Diagrams: 


 08/25/18 06:30





 08/25/18 06:30


Labs: 


 Laboratory Results - last 24 hr











  08/25/18 08/25/18 08/25/18





  06:00 06:00 06:30


 


WBC   


 


RBC   


 


Hgb   


 


Hct   


 


MCV   


 


MCH   


 


MCHC   


 


RDW   


 


Plt Count   


 


Gran %   


 


Lymph % (Auto)   


 


Mono % (Auto)   


 


Eos % (Auto)   


 


Baso % (Auto)   


 


Gran #   


 


Lymph # (Auto)   


 


Mono # (Auto)   


 


Eos # (Auto)   


 


Baso # (Auto)   


 


Sodium    141


 


Potassium    3.7


 


Chloride    105


 


Carbon Dioxide    27


 


Anion Gap    13


 


BUN    13


 


Creatinine    0.8


 


Est GFR ( Amer)    > 60


 


Est GFR (Non-Af Amer)    > 60


 


Random Glucose    87


 


Calcium    8.9


 


Phosphorus    3.1


 


Magnesium    1.8


 


Iron  49  


 


TIBC  268  


 


% Saturation  18 L  


 


Total Bilirubin   1.2  1.2


 


Direct Bilirubin   0.1 


 


AST    27


 


ALT    24


 


Alkaline Phosphatase    44


 


Total Protein    6.2


 


Albumin    3.7


 


Globulin    2.5


 


Albumin/Globulin Ratio    1.5














  08/25/18





  06:30


 


WBC  9.3  D


 


RBC  5.65


 


Hgb  11.2 L D


 


Hct  33.8 L


 


MCV  59.8 L


 


MCH  19.8 L


 


MCHC  33.1


 


RDW  16.1 H


 


Plt Count  161


 


Gran %  73.3 H


 


Lymph % (Auto)  16.2 L


 


Mono % (Auto)  9.8 H


 


Eos % (Auto)  0.4 L


 


Baso % (Auto)  0.3


 


Gran #  6.82 H


 


Lymph # (Auto)  1.5


 


Mono # (Auto)  0.9 H


 


Eos # (Auto)  0.0


 


Baso # (Auto)  0.03


 


Sodium 


 


Potassium 


 


Chloride 


 


Carbon Dioxide 


 


Anion Gap 


 


BUN 


 


Creatinine 


 


Est GFR ( Amer) 


 


Est GFR (Non-Af Amer) 


 


Random Glucose 


 


Calcium 


 


Phosphorus 


 


Magnesium 


 


Iron 


 


TIBC 


 


% Saturation 


 


Total Bilirubin 


 


Direct Bilirubin 


 


AST 


 


ALT 


 


Alkaline Phosphatase 


 


Total Protein 


 


Albumin 


 


Globulin 


 


Albumin/Globulin Ratio 














Assessment & Plan





- Assessment and Plan (Free Text)


Assessment: 





23 yo Male with h/o Anx+Panic Attacks, MJ use presenting with n/v and abd pain.





# N/V and Abd Pain:  Perhaps related to cannabis hyperemesis syndrome, viral 

infection (reported URI symptoms prior), or related to panic attack as had 

symptoms prior.  No lower GI complaints.  Lipase elevation likely related to N/V

, no signs of pancreatic inflammation and symptoms not consistent with 

pancreatitis.  Nonetheless, treatment plan is the same.


# Microcytic Anemia: Unclear etiology.  No signs of active GI bleed. F/u Hgb 

Electropheresis, in negative can consider OP EGD


# Hepatic Steatosis vs Parenchymal disease: Seen on US but not on CT, unclear 

if clinically significant.  Agree with viral hep check.


# Leukocytosis: 18 -> 9 without supportive care alone.


Plan: 





- Supportive care


- Advanced diet to regular as pt reports no further symptoms and feels hungry


- F/u Hgb electrophoresis, consider OP EGD if w/u negative, no signs of GI bleed


- F/u HCV Ab


- Counseled on marijuana cessation





Pt to be discussed with Dr. Berman; see his attestation for further recs/changes.

## 2018-08-26 LAB
MCH RBC QN AUTO: 20 PG (ref 27–33)
MCV RBC: 66 FL (ref 80–100)